# Patient Record
Sex: FEMALE | Race: WHITE | NOT HISPANIC OR LATINO | Employment: OTHER | URBAN - NONMETROPOLITAN AREA
[De-identification: names, ages, dates, MRNs, and addresses within clinical notes are randomized per-mention and may not be internally consistent; named-entity substitution may affect disease eponyms.]

---

## 2024-06-10 ENCOUNTER — OFFICE VISIT (OUTPATIENT)
Dept: NEUROLOGY | Facility: CLINIC | Age: 64
End: 2024-06-10
Payer: MEDICARE

## 2024-06-10 ENCOUNTER — TELEPHONE (OUTPATIENT)
Dept: NEUROLOGY | Facility: CLINIC | Age: 64
End: 2024-06-10
Payer: MEDICARE

## 2024-06-10 VITALS
BODY MASS INDEX: 29.03 KG/M2 | WEIGHT: 185 LBS | SYSTOLIC BLOOD PRESSURE: 142 MMHG | HEART RATE: 78 BPM | DIASTOLIC BLOOD PRESSURE: 76 MMHG | HEIGHT: 67 IN | OXYGEN SATURATION: 94 %

## 2024-06-10 DIAGNOSIS — M54.2 NECK PAIN: ICD-10-CM

## 2024-06-10 DIAGNOSIS — G44.329 CHRONIC POST-TRAUMATIC HEADACHE, NOT INTRACTABLE: Primary | ICD-10-CM

## 2024-06-10 DIAGNOSIS — G43.109 MIGRAINE WITH AURA AND WITHOUT STATUS MIGRAINOSUS, NOT INTRACTABLE: ICD-10-CM

## 2024-06-10 DIAGNOSIS — G47.19 DAYTIME HYPERSOMNOLENCE: ICD-10-CM

## 2024-06-10 DIAGNOSIS — G44.40 MEDICATION OVERUSE HEADACHE: ICD-10-CM

## 2024-06-10 PROCEDURE — 1159F MED LIST DOCD IN RCRD: CPT | Performed by: NURSE PRACTITIONER

## 2024-06-10 PROCEDURE — 1160F RVW MEDS BY RX/DR IN RCRD: CPT | Performed by: NURSE PRACTITIONER

## 2024-06-10 PROCEDURE — 99204 OFFICE O/P NEW MOD 45 MIN: CPT | Performed by: NURSE PRACTITIONER

## 2024-06-10 RX ORDER — ALMOTRIPTAN 12.5 MG/1
TABLET, FILM COATED ORAL ONCE AS NEEDED
COMMUNITY
End: 2024-06-10 | Stop reason: SDUPTHER

## 2024-06-10 RX ORDER — METHYLPREDNISOLONE 4 MG/1
TABLET ORAL
Qty: 21 TABLET | Refills: 0 | Status: SHIPPED | OUTPATIENT
Start: 2024-06-10

## 2024-06-10 RX ORDER — AMITRIPTYLINE HYDROCHLORIDE 50 MG/1
50 TABLET, FILM COATED ORAL NIGHTLY
COMMUNITY
End: 2024-06-10 | Stop reason: SDUPTHER

## 2024-06-10 RX ORDER — BLOOD-GLUCOSE SENSOR
EACH MISCELLANEOUS
COMMUNITY
Start: 2024-03-06

## 2024-06-10 RX ORDER — MONTELUKAST SODIUM 10 MG/1
10 TABLET ORAL NIGHTLY
COMMUNITY

## 2024-06-10 RX ORDER — INSULIN DETEMIR 100 [IU]/ML
INJECTION, SOLUTION SUBCUTANEOUS
COMMUNITY

## 2024-06-10 RX ORDER — DAPAGLIFLOZIN 10 MG/1
10 TABLET, FILM COATED ORAL DAILY
COMMUNITY
Start: 2024-04-02

## 2024-06-10 RX ORDER — CYANOCOBALAMIN 1000 UG/ML
1000 INJECTION, SOLUTION INTRAMUSCULAR; SUBCUTANEOUS
COMMUNITY
Start: 2024-05-10

## 2024-06-10 RX ORDER — ESTRADIOL 1 MG/1
1 TABLET ORAL DAILY
COMMUNITY
Start: 2024-05-28

## 2024-06-10 RX ORDER — INSULIN ASPART 100 [IU]/ML
INJECTION, SOLUTION INTRAVENOUS; SUBCUTANEOUS
COMMUNITY
Start: 2024-04-02

## 2024-06-10 RX ORDER — ALMOTRIPTAN 12.5 MG/1
12.5 TABLET, FILM COATED ORAL ONCE AS NEEDED
Qty: 10 TABLET | Refills: 3 | Status: SHIPPED | OUTPATIENT
Start: 2024-06-10

## 2024-06-10 RX ORDER — ALPRAZOLAM 0.25 MG/1
0.25 TABLET ORAL NIGHTLY PRN
COMMUNITY

## 2024-06-10 RX ORDER — ROSUVASTATIN CALCIUM 5 MG/1
5 TABLET, COATED ORAL DAILY
COMMUNITY
Start: 2024-04-02

## 2024-06-10 RX ORDER — AMITRIPTYLINE HYDROCHLORIDE 100 MG/1
100 TABLET ORAL NIGHTLY
Qty: 30 TABLET | Refills: 5 | Status: SHIPPED | OUTPATIENT
Start: 2024-06-10

## 2024-06-10 NOTE — ASSESSMENT & PLAN NOTE
She has had quite a bit of neck pain/tension for a while.  This did seem to get a little worse with regard to her fall.  We will get her into PT.

## 2024-06-10 NOTE — ASSESSMENT & PLAN NOTE
She is routinely taking OTC medications and I am concerned that this is causing some rebound headache. We will send a medrol pack and I have advised against using these mediations for 6 weeks if able.

## 2024-06-10 NOTE — TELEPHONE ENCOUNTER
Kirsten with [facility] calls asking clarification for the  prescription for Axert should read as take one at onset of migraine and may repeat in 2 hours if needed instead of take one at onset of migraine.  I did ask Morteza Burks NP and  he does state it should read as may take one at onset and repeat in 2 hours.  Pharmacy voices understanding and will make correction.
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
## 2024-06-10 NOTE — ASSESSMENT & PLAN NOTE
Her migraines seem to be doing well with the use of Axert and Elavil.  We will continue these and increase Elavil as above.
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
## 2024-06-10 NOTE — ASSESSMENT & PLAN NOTE
She has some symptoms that are concerning for sleep apnea which do include jaw clenching, awakenings at night, and snoring. We will ensure she has no RULA.  She was tested many years ago back in the 90's.

## 2024-07-16 ENCOUNTER — HOSPITAL ENCOUNTER (OUTPATIENT)
Dept: SLEEP CENTER | Age: 64
Discharge: HOME | End: 2024-07-18
Payer: MEDICARE

## 2024-07-16 PROCEDURE — G0399 HOME SLEEP TEST/TYPE 3 PORTA: HCPCS

## 2024-07-16 NOTE — PROGRESS NOTES
Ms. Yolanda Lindsey presented today in the sleep center for a Home Sleep Test (HST).  Ms. Lindsey was instructed on the device and was requested to wear the unit for two nights. Ms. Lindsey was asked to have the HST monitor back by 12PM on  07/18/2024. The patient acknowledged she understood. The HST device was tested and was in working order.

## 2024-07-17 PROCEDURE — G0399 HOME SLEEP TEST/TYPE 3 PORTA: HCPCS

## 2024-07-27 DIAGNOSIS — G47.33 OSA (OBSTRUCTIVE SLEEP APNEA): Primary | ICD-10-CM

## 2024-07-31 DIAGNOSIS — G47.19 DAYTIME HYPERSOMNOLENCE: ICD-10-CM

## 2024-08-30 ENCOUNTER — TELEPHONE (OUTPATIENT)
Dept: NEUROLOGY | Facility: CLINIC | Age: 64
End: 2024-08-30
Payer: MEDICARE

## 2024-08-30 NOTE — TELEPHONE ENCOUNTER
CALLED PATIENT TO LET THEM KNOW THAT WE HAVE CANCELED THEIR APPOINTMENT. JODY LAST OFFICIAL DAY IS TODAY AND HE WILL NO LONGER BE SEEING ANY PATIENTS IN NEUROLOGY GOING FORWARD. I LET THEM KNOW AT THIS TIME WE DO NOT HAVE A NEW PROVIDER COMING IN TO TAKE OVER HIS PATIENTS BUT WE ARE LOOKING FOR SOMEONE, EVERYONE IS ON A WAIT LIST AND ONCE THEY HAVE SOMEONE ABLE TO COME IN WE WILL GIVE ALL OF [facility] PATIENTS A CALL AND GET THEM SCHEDUELD. I LET THEM ALSO KNOW THAT WE ARE STILL ABLE TO FILL ANY MEDS NEEDED THAT THEY HAVE ALREADY BEEN ON BUT WE WILL NOT BE ABLE TO MAKE ANY CHANGES AND IF THEY NEED CHANGES OR ARE HAVING NEW PROBLEMS OR CONCERNS THEY WILL NEED TO DIRECT THOSE TO THEIR PRIMARY CARE PROVIDER AND THEY WILL NEED TO HANDLE THOSE CONCERNS OR REFER THEM TO NEW NEUROLOGIST.   PATIENT VOICED UNDERSTANDING.

## 2025-02-21 DIAGNOSIS — G44.329 CHRONIC POST-TRAUMATIC HEADACHE, NOT INTRACTABLE: ICD-10-CM

## 2025-02-21 RX ORDER — AMITRIPTYLINE HYDROCHLORIDE 100 MG/1
100 TABLET ORAL NIGHTLY
Qty: 30 TABLET | Refills: 0 | Status: SHIPPED | OUTPATIENT
Start: 2025-02-21

## 2025-02-21 NOTE — TELEPHONE ENCOUNTER
Caller: BaldwinNamrata    Relationship: Self    Best call back number:   Telephone Information:   Mobile [phone]         Requested Prescriptions:   Requested Prescriptions     Pending Prescriptions Disp Refills    amitriptyline (ELAVIL) 100 MG tablet 30 tablet 5     Sig: Take 1 tablet by mouth Every Night.        Pharmacy where request should be sent: [facility] [address] [place] [address] [phone] [facility] [phone]      Last office visit with prescribing clinician: Visit date not found   Last telemedicine visit with prescribing clinician: Visit date not found   Next office visit with prescribing clinician: Visit date not found     Additional details provided by patient: PT STATES SHE RAN OUT OF MEDICATION ON 2-17-25 AND HER PCP WILL NOT REFILL [facility] CHANGED THE DOSE. SHE HAS NOT BEEN CONTACTED TO ESTABLISH WITH A DIFFERENT PROVIDER SINCE HER PROVIDER LEFT. SO SHE'S NOT SURE WHO SHE CAN SCHEDULE WITH, PLEASE REVIEW AND ADVISE.     Does the patient have less than a 3 day supply:  [x] Yes  [] No    Would you like a call back once the refill request has been completed: [] Yes [x] No    If the office needs to give you a call back, can they leave a voicemail: [] Yes [x] No    Lorena Castro Rep   02/21/25 10:54 CST

## 2025-03-18 DIAGNOSIS — G44.329 CHRONIC POST-TRAUMATIC HEADACHE, NOT INTRACTABLE: ICD-10-CM

## 2025-03-18 RX ORDER — AMITRIPTYLINE HYDROCHLORIDE 100 MG/1
100 TABLET ORAL NIGHTLY
Qty: 30 TABLET | Refills: 0 | Status: SHIPPED | OUTPATIENT
Start: 2025-03-18

## 2025-03-31 ENCOUNTER — TELEPHONE (OUTPATIENT)
Dept: NEUROLOGY | Facility: CLINIC | Age: 65
End: 2025-03-31
Payer: MEDICARE

## 2025-04-02 ENCOUNTER — OFFICE VISIT (OUTPATIENT)
Dept: NEUROLOGY | Facility: CLINIC | Age: 65
End: 2025-04-02
Payer: MEDICARE

## 2025-04-02 VITALS
WEIGHT: 198 LBS | HEART RATE: 86 BPM | DIASTOLIC BLOOD PRESSURE: 88 MMHG | HEIGHT: 67 IN | OXYGEN SATURATION: 96 % | SYSTOLIC BLOOD PRESSURE: 126 MMHG | BODY MASS INDEX: 31.08 KG/M2

## 2025-04-02 DIAGNOSIS — R51.9 HEADACHE UPON AWAKENING: ICD-10-CM

## 2025-04-02 DIAGNOSIS — G43.109 MIGRAINE WITH AURA AND WITHOUT STATUS MIGRAINOSUS, NOT INTRACTABLE: ICD-10-CM

## 2025-04-02 DIAGNOSIS — G44.329 CHRONIC POST-TRAUMATIC HEADACHE, NOT INTRACTABLE: Primary | ICD-10-CM

## 2025-04-02 RX ORDER — INSULIN GLARGINE 100 [IU]/ML
40 INJECTION, SOLUTION SUBCUTANEOUS NIGHTLY
COMMUNITY

## 2025-04-02 RX ORDER — AMITRIPTYLINE HYDROCHLORIDE 100 MG/1
100 TABLET ORAL NIGHTLY
Qty: 90 TABLET | Refills: 3 | Status: SHIPPED | OUTPATIENT
Start: 2025-04-02 | End: 2026-04-02

## 2025-04-02 NOTE — PROGRESS NOTES
Neurology Consult Note    [facility]  [address], [address]  [place] [address]  Phone: [phone]  Fax: [phone]    Referring Provider:   No ref. provider found  Primary Care Provider:  Erick Bhatt MD    Reason for Consult:  Headache syndromes   Subjective      Namrata Baldwin presents to [facility]    History of Present Illness  64-year-old female seen for follow-up of chronic posttraumatic headache, migraine and medication overuse headache.  She was last seen in clinic on 6/10/2024 by AUSTIN Mary.  Review that record shows patient has a history of migraines since puberty.  They stopped after her first child however returned after going through menopause.  She also noted worsening headaches after a fall.  She reported last encounter having a migraine approximately once a week.  She also notes daily headaches that occur upon awakening.  She reported taking ibuprofen approximately 4 times weekly    She did have a home sleep study performed in July 2024 through [facility].  This did reveal moderate obstructive sleep apnea.  It was recommended to consider an AutoPap    Today patient presents by herself.  Reports to me continued daily headaches that occur in the morning upon awakening.  Typically these will go away within a couple of hours.  She does question the sleep study she had performed last year.  Reportedly the pulse oximetry fell off during the test.  She declined wanting to have a CPAP.  She notes she does clench her jaw during the night.  She is currently taking Xanax 0.5 mg at night to help with this.  She is a respiratory therapist.    In regards to her migraines, notes she has had a slight increase due to the recent weather changes.  However the almotriptan does continue to work as an abortive agent.  Typically she only take 1 tablet and will be pain-free within 3 hours.  She does lie down when she takes this.  Patient Active Problem  List   Diagnosis    Chronic post-traumatic headache, not intractable    Daytime hypersomnolence    Migraine with aura and without status migrainosus, not intractable    Neck pain    Medication overuse headache        Past Medical History:   Diagnosis Date    Cancer 6/2000    Basal Cell    CTS (carpal tunnel syndrome) 2020    Diabetes mellitus 2008    Head injury Feb 26,2024    Headache, tension-type 1976    HL (hearing loss) 1994    Hyperlipidemia 2015    Migraine 2004        Social History     Socioeconomic History    Marital status:    Tobacco Use    Smoking status: Never    Smokeless tobacco: Never   Vaping Use    Vaping status: Never Used   Substance and Sexual Activity    Alcohol use: Never    Drug use: Never    Sexual activity: Yes     Partners: Male     Birth control/protection: Post-menopausal        Allergies   Allergen Reactions    Hexachlorophene Hives and Rash    Hydrocodone Other (See Comments) and Shortness Of Breath     STRIDOR    Hydroxyzine Hcl Swelling     Other reaction(s): FLUSH    -    Meperidine Other (See Comments) and Shortness Of Breath     - STRIDOR    Morphine Other (See Comments) and Shortness Of Breath     STRIDOR    Nalbuphine Hcl Other (See Comments) and Shortness Of Breath     - STRIDOR    Oxycodone Hcl Other (See Comments) and Shortness Of Breath     - STRIDOR    Propoxyphene Other (See Comments) and Shortness Of Breath     - STRIDOR    Tramadol Other (See Comments) and Shortness Of Breath     - STRIDOR    Rosuvastatin Other (See Comments)     SEVERE LEG CRAMPING    Chloral Hydrate Rash    Colistimethate Rash    Gramicidin Rash    Neomycin Rash    Polymyxin B Rash    Povidone-Iodine Rash    Pramoxine Hcl Rash    Soap Rash          Current Outpatient Medications:     almotriptan (AXERT) 12.5 MG tablet, Take 1 tablet by mouth 1 (One) Time As Needed for Migraine., Disp: 10 tablet, Rfl: 3    ALPRAZolam (XANAX) 0.25 MG tablet, Take 1 tablet by mouth At Night As Needed., Disp: ,  "Rfl:     amitriptyline (ELAVIL) 100 MG tablet, Take 1 tablet by mouth Every Night., Disp: 90 tablet, Rfl: 3    Continuous Glucose Sensor (FreeStyle Patricia 3 Sensor) misc, USE AS DIRECTED 1 EACH CONTINUEOUSLY, Disp: , Rfl:     cyanocobalamin 1000 MCG/ML injection, Inject 1 mL under the skin into the appropriate area as directed Every 30 (Thirty) Days., Disp: , Rfl:     dapagliflozin Propanediol 10 MG tablet, Take 10 mg by mouth Daily., Disp: , Rfl:     estradiol (ESTRACE) 1 MG tablet, Take 1 tablet by mouth Daily., Disp: , Rfl:     montelukast (SINGULAIR) 10 MG tablet, Take 1 tablet by mouth Every Night., Disp: , Rfl:     NovoLOG FlexPen 100 UNIT/ML solution pen-injector sc pen, Inject  under the skin into the appropriate area as directed., Disp: , Rfl:     rosuvastatin (CRESTOR) 5 MG tablet, Take 1 tablet by mouth Daily., Disp: , Rfl:     insulin glargine (LANTUS, SEMGLEE) 100 UNIT/ML injection, Inject 40 Units under the skin into the appropriate area as directed Every Night., Disp: , Rfl:        Objective   Vital Signs:   /88   Pulse 86   Ht 170.2 cm (67\")   Wt 89.8 kg (198 lb)   SpO2 96%   BMI 31.01 kg/m²       Physical Exam  Vitals and nursing note reviewed.   Constitutional:       Appearance: Normal appearance.   HENT:      Head: Normocephalic.   Eyes:      General: Lids are normal.      Extraocular Movements: Extraocular movements intact.      Pupils: Pupils are equal, round, and reactive to light.   Pulmonary:      Effort: Pulmonary effort is normal. No respiratory distress.   Skin:     General: Skin is warm and dry.   Neurological:      Mental Status: She is alert.      Motor: Motor strength is normal.  Psychiatric:         Speech: Speech normal.        Neurological Exam  Mental Status  Alert. Oriented to person, place, time and situation. Speech is normal. Language is fluent with no aphasia.    Cranial Nerves  CN II: Visual fields full to confrontation.  CN III, IV, VI: Extraocular movements intact " bilaterally. Normal lids and orbits bilaterally. Pupils equal round and reactive to light bilaterally.  CN V: Facial sensation is normal.  CN VII: Full and symmetric facial movement.  CN IX, X: Palate elevates symmetrically. Normal gag reflex.  CN XI: Shoulder shrug strength is normal.  CN XII: Tongue midline without atrophy or fasciculations.    Motor   Strength is 5/5 throughout all four extremities.    Gait  Casual gait is normal including stance, stride, and arm swing.      Result Review :   The following data was reviewed by: AUSTIN Sterling on 2025:       Office Visit with Morteza Burks APRN (06/10/2024)     Progress Notes  - documented in this encounter   Table of Contents for Progress Notes   Brianna Jackson MD - 2024 3:00 PM CDT   Nahun Angulo - 2024 3:00 PM CDT   Brianna Jackson MD - 2024 3:00 PM CDT      Brianna Jackson MD - 2024 3:00 PM CDT  Formatting of this note is different from the original.  Images from the original note were not included.  [facility]  [address] [place] [address]  Phone [phone] Fax [phone]    Patient Name Namrata Baldwin Account Number [id number]   1960 Referring Provider AUSTIN Mary  Age/ Gender 64 years/F Interpreting physician Brianna Jackson M.D., [facility], [facility]  Neck circumference Unavailable Device Liz Dobbins  Mallampati classification Unavailable Scoring Technician Nuzhat Andre, ELVER, RPSGT  [place] score 8/24 Indications for the test excessive daytime somnolence  Height 67.0 inches Test Home Sleep Apnea Test  Weight 185.0 lbs Date of test 2024  BMI 29.0 Time in Bed (TIB) 241.0 minutes    Events  Index (#/hr) Total # Events Mean Duration (sec) Max Duration (sec) Events by Position  Supine Non-Supine  # Index # Index  Central Apneas 0.0 0 0.0 0.0 0 0.0 0 0.0  Obstructive Apneas 12.2 49 24.2 69.5 0 0.0 1 75.0  Mixed Apneas 0.0 0  0.0 0.0 0 0.0 0 0.0  Hypopneas 9.2 37 39.6 117.5 0 0.0 0 0.0  Estimated AHI  #events/TIB (hrs) 21.4 86 30.8 117.5 0.0 75.0  Time in Position (minutes) 2.1 0.8    Snoring  TST with snoring 153.6  % of TST with snoring 63.7    Oximetry distribution  <95 % (minutes) 101.9  <90 % (minutes) 1.7  <85 % (minutes) 0.0  <80 % (minutes) 0.0  <75 % (minutes) 0.0  <70 % (minutes) 0.0  <60 % (minutes) 0.0  <50 % (minutes) 0.0  Average (%) 95  Desat Index (#/hour) 19.6  Desat Max (%) 10  Desat Max dur (sec) 123.0  Lowest SpO2 (³ 2 sec) (%) 87    Heart Rate  Mean HR (BPM) 77.5  # of LHR 3  LHR min (BPM) 69  # of HHR 1  HHR max (BPM) 105    Interpretation:    Moderate obstructive sleep apnea.  Hypoxia during sleep.    Recommendations:    Consider Auto CPAP to titrate between 8cm water pressure and 20cm water pressure.  Weight loss and exercise.  Avoid risky activity such as driving if sleepy.  Discuss sleep hygiene with the patient.  Monitor PAP use and effectiveness.      Brianna Jackson MD, [facility], [facility]  Electronically signed by Ni Mcnally at 07/22/2024 2:32 PM EDT  Electronically signed by Brianna Jackson MD at 07/27/2024 10:04 PM EDT               Impression:  Namrata Baldwin is a 64 y.o. female who presents for follow-up of headache syndromes.  Patient's migraines under control with the use of amitriptyline 100 mg nightly.  The slight increase in the last month is likely due to the significant weather changes we have been experiencing.  She notes this can be a trigger for her.  Regards to her daily headaches, likely the result of her jaw clenching as well as obstructive sleep apnea as evident by home sleep study.  However patient has declined wanting to use a CPAP.  Continue recommending current regimen.    Diagnoses and all orders for this visit:    1. Chronic post-traumatic headache, not intractable (Primary)  -     amitriptyline (ELAVIL) 100 MG tablet; Take 1 tablet by mouth Every Night.  Dispense: 90  tablet; Refill: 3    2. Migraine with aura and without status migrainosus, not intractable    3. Headache upon awakening        Plan:  Continue amitriptyline 100 mg nightly for prevention  Continue almotriptan 12.5 mg tablet as needed for   Defer further discussion of management of home sleep study and results of sleep apnea to sleep medicine  Follow-up with PCP as scheduled  Follow-up in my clinic 6 months or sooner if needed    The patient and I have discussed the plan of care and she is in full agreement at this time.     Follow Up   Return in about 6 months (around 10/2/2025) for Migraine.            Ramirez Phillip, AUSTIN  25  09:18 CDT

## 2025-05-08 ENCOUNTER — OFFICE VISIT (OUTPATIENT)
Dept: PULMONOLOGY | Age: 65
End: 2025-05-08
Payer: MEDICARE

## 2025-05-08 VITALS
RESPIRATION RATE: 20 BRPM | HEART RATE: 81 BPM | DIASTOLIC BLOOD PRESSURE: 77 MMHG | TEMPERATURE: 97.7 F | SYSTOLIC BLOOD PRESSURE: 126 MMHG | BODY MASS INDEX: 32.43 KG/M2 | HEIGHT: 67 IN | WEIGHT: 206.6 LBS | OXYGEN SATURATION: 96 %

## 2025-05-08 DIAGNOSIS — E66.9 OBESITY (BMI 30-39.9): ICD-10-CM

## 2025-05-08 DIAGNOSIS — G47.8 NON-RESTORATIVE SLEEP: ICD-10-CM

## 2025-05-08 DIAGNOSIS — Z78.9 NEVER SMOKED ANY SUBSTANCE: ICD-10-CM

## 2025-05-08 DIAGNOSIS — R06.83 SNORING: ICD-10-CM

## 2025-05-08 DIAGNOSIS — G47.33 MODERATE OBSTRUCTIVE SLEEP APNEA: Primary | ICD-10-CM

## 2025-05-08 PROCEDURE — 1090F PRES/ABSN URINE INCON ASSESS: CPT | Performed by: INTERNAL MEDICINE

## 2025-05-08 PROCEDURE — 1123F ACP DISCUSS/DSCN MKR DOCD: CPT | Performed by: INTERNAL MEDICINE

## 2025-05-08 PROCEDURE — 1036F TOBACCO NON-USER: CPT | Performed by: INTERNAL MEDICINE

## 2025-05-08 PROCEDURE — G8417 CALC BMI ABV UP PARAM F/U: HCPCS | Performed by: INTERNAL MEDICINE

## 2025-05-08 PROCEDURE — G8399 PT W/DXA RESULTS DOCUMENT: HCPCS | Performed by: INTERNAL MEDICINE

## 2025-05-08 PROCEDURE — 1159F MED LIST DOCD IN RCRD: CPT | Performed by: INTERNAL MEDICINE

## 2025-05-08 PROCEDURE — G8427 DOCREV CUR MEDS BY ELIG CLIN: HCPCS | Performed by: INTERNAL MEDICINE

## 2025-05-08 PROCEDURE — 99204 OFFICE O/P NEW MOD 45 MIN: CPT | Performed by: INTERNAL MEDICINE

## 2025-05-08 PROCEDURE — 3017F COLORECTAL CA SCREEN DOC REV: CPT | Performed by: INTERNAL MEDICINE

## 2025-05-08 RX ORDER — MONTELUKAST SODIUM 10 MG/1
10 TABLET ORAL NIGHTLY
COMMUNITY

## 2025-05-08 RX ORDER — FLURBIPROFEN SODIUM 0.3 MG/ML
SOLUTION/ DROPS OPHTHALMIC
COMMUNITY
Start: 2025-04-09

## 2025-05-08 RX ORDER — DAPAGLIFLOZIN 10 MG/1
TABLET, FILM COATED ORAL
COMMUNITY

## 2025-05-08 RX ORDER — ROSUVASTATIN CALCIUM 5 MG/1
5 TABLET, COATED ORAL DAILY
COMMUNITY
Start: 2025-04-07

## 2025-05-08 RX ORDER — ANTIOX #8/OM3/DHA/EPA/LUT/ZEAX 250-2.5 MG
CAPSULE ORAL
COMMUNITY
Start: 2023-03-03

## 2025-05-08 RX ORDER — ESTRADIOL 1 MG/1
1 TABLET ORAL DAILY
COMMUNITY
Start: 2024-05-28

## 2025-05-08 RX ORDER — PREGABALIN 25 MG/1
25 CAPSULE ORAL 2 TIMES DAILY
COMMUNITY

## 2025-05-08 RX ORDER — INSULIN GLARGINE 100 [IU]/ML
INJECTION, SOLUTION SUBCUTANEOUS
COMMUNITY

## 2025-05-08 RX ORDER — ALMOTRIPTAN 12.5 MG/1
12.5 TABLET, FILM COATED ORAL
COMMUNITY
Start: 2024-06-10

## 2025-05-08 RX ORDER — MULTIVIT WITH MINERALS/LUTEIN
TABLET ORAL
COMMUNITY
Start: 2017-10-09

## 2025-05-08 RX ORDER — ALPRAZOLAM 0.25 MG
TABLET ORAL
COMMUNITY

## 2025-05-08 RX ORDER — AMITRIPTYLINE HYDROCHLORIDE 100 MG/1
100 TABLET ORAL NIGHTLY
COMMUNITY

## 2025-05-08 RX ORDER — SEMAGLUTIDE 0.68 MG/ML
0.25 INJECTION, SOLUTION SUBCUTANEOUS
COMMUNITY
Start: 2025-04-22

## 2025-05-08 RX ORDER — INSULIN GLARGINE 100 [IU]/ML
40 INJECTION, SOLUTION SUBCUTANEOUS NIGHTLY
COMMUNITY

## 2025-05-08 RX ORDER — INSULIN ASPART 100 [IU]/ML
INJECTION, SOLUTION INTRAVENOUS; SUBCUTANEOUS
COMMUNITY
Start: 2025-04-21

## 2025-05-08 ASSESSMENT — ENCOUNTER SYMPTOMS
ANAL BLEEDING: 0
BACK PAIN: 0
APNEA: 1
COUGH: 0
RHINORRHEA: 0
SHORTNESS OF BREATH: 0
WHEEZING: 0
ABDOMINAL DISTENTION: 0
ABDOMINAL PAIN: 0

## 2025-05-08 NOTE — PROGRESS NOTES
Pulmonary and Sleep Medicine    Yolanda Lindsey (:  1960) is a 65 y.o. female,New patient, here for evaluation of the following chief complaint(s):  New Patient (NP- SHANELL/Sleep Study completed on 2024/Pt is not on CPAP. She states that she would like to know about her sleep study. She states that the pulse oximeter fell off of her finger. )      Referring physician:  Christopher Ortiz, Aprn - Cnp  [address]  [place] [address]     ASSESSMENT/PLAN:  1. Moderate obstructive sleep apnea.  Apnea-hypopnea index of 21 events per hour on home sleep study done on 2024  -     [facility] [place] as [place]  2. Obesity (BMI 30-39.9)  3. Non-restorative sleep  4. Snoring  5. Never smoked any substance        Will start her on auto CPAP.  Will reevaluate again in about 6 weeks.          Ranjith Hogan MD, [facility] [place] [facility]    No follow-ups on file.    SUBJECTIVE/OBJECTIVE:        The patient is here for evaluation of obstructive sleep apnea.  She underwent a home sleep study in 2024.  Her sleep study showed moderate obstructive sleep apnea with an apnea-hypopnea index of about 21 events per hour.  She elected not to start CPAP at that time.  Since then the patient discontinued sitting starting CPAP due to sleep-related hypoxia.  She endorses daytime symptoms of fatigue and hypersomnia and sleepiness.  She does take naps during the day.  She never smoked.          Continue the following medications as reported by the patient:    Prior to Visit Medications    Medication Sig Taking? Authorizing Provider   almotriptan (AXERT) 12.5 MG tablet Take 1 tablet by mouth once as needed Yes Gabbi Corona MD   rosuvastatin (CRESTOR) 5 MG tablet Take 1 tablet by mouth daily Yes ProviderGabbi MD   ALPRAZolam (XANAX) 0.25 MG tablet TAKE 1 TABLET BY MOUTH THREE TIMES DAILY AS NEEDED FOR SLEEP Yes ProviderGabbi MD   amitriptyline (ELAVIL) 100 MG tablet Take 1 tablet by mouth at

## 2025-05-09 DIAGNOSIS — G47.33 MODERATE OBSTRUCTIVE SLEEP APNEA: Primary | ICD-10-CM

## 2025-07-14 ENCOUNTER — OFFICE VISIT (OUTPATIENT)
Dept: PULMONOLOGY | Age: 65
End: 2025-07-14
Payer: MEDICARE

## 2025-07-14 VITALS
SYSTOLIC BLOOD PRESSURE: 146 MMHG | HEART RATE: 92 BPM | BODY MASS INDEX: 32.39 KG/M2 | WEIGHT: 206.8 LBS | TEMPERATURE: 97.8 F | RESPIRATION RATE: 95 BRPM | DIASTOLIC BLOOD PRESSURE: 85 MMHG

## 2025-07-14 DIAGNOSIS — Z78.9 NEVER SMOKED ANY SUBSTANCE: ICD-10-CM

## 2025-07-14 DIAGNOSIS — G47.33 MODERATE OBSTRUCTIVE SLEEP APNEA: Primary | ICD-10-CM

## 2025-07-14 DIAGNOSIS — R06.83 SNORING: ICD-10-CM

## 2025-07-14 DIAGNOSIS — E66.9 OBESITY (BMI 30-39.9): ICD-10-CM

## 2025-07-14 DIAGNOSIS — G47.8 NON-RESTORATIVE SLEEP: ICD-10-CM

## 2025-07-14 PROCEDURE — 1159F MED LIST DOCD IN RCRD: CPT | Performed by: NURSE PRACTITIONER

## 2025-07-14 PROCEDURE — 3017F COLORECTAL CA SCREEN DOC REV: CPT | Performed by: NURSE PRACTITIONER

## 2025-07-14 PROCEDURE — 1036F TOBACCO NON-USER: CPT | Performed by: NURSE PRACTITIONER

## 2025-07-14 PROCEDURE — 1090F PRES/ABSN URINE INCON ASSESS: CPT | Performed by: NURSE PRACTITIONER

## 2025-07-14 PROCEDURE — 1123F ACP DISCUSS/DSCN MKR DOCD: CPT | Performed by: NURSE PRACTITIONER

## 2025-07-14 PROCEDURE — G8399 PT W/DXA RESULTS DOCUMENT: HCPCS | Performed by: NURSE PRACTITIONER

## 2025-07-14 PROCEDURE — G8427 DOCREV CUR MEDS BY ELIG CLIN: HCPCS | Performed by: NURSE PRACTITIONER

## 2025-07-14 PROCEDURE — 99214 OFFICE O/P EST MOD 30 MIN: CPT | Performed by: NURSE PRACTITIONER

## 2025-07-14 PROCEDURE — G8417 CALC BMI ABV UP PARAM F/U: HCPCS | Performed by: NURSE PRACTITIONER

## 2025-07-14 ASSESSMENT — ENCOUNTER SYMPTOMS
ALLERGIC/IMMUNOLOGIC NEGATIVE: 1
GASTROINTESTINAL NEGATIVE: 1
EYES NEGATIVE: 1
APNEA: 1

## 2025-07-14 NOTE — PROGRESS NOTES
-300, 8 UNITS -350, AND 10 UNITS FOR >350. MAX 30 UNITS PER DAY. Yes Gabbi Corona MD   LANTUS SOLOSTAR 100 UNIT/ML injection pen INJECT 40 UNITS BY SUBCUTANEOUS ROUTE NIGHTLY Yes Gabbi Corona MD   insulin glargine (LANTUS) 100 UNIT/ML injection vial Inject 40 Units into the skin nightly Yes Gabbi Corona MD   OZEMPIC, 0.25 OR 0.5 MG/DOSE, 2 MG/3ML SOPN Inject 0.25 mg into the skin every 7 days Yes Gabbi Corona MD B-D UF III MINI PEN NEEDLES 31G X 5 MM MISC USE 1 EACH 4 TIMES DAILY AS DIRECTED Yes Gabbi Corona MD   pregabalin (LYRICA) 25 MG capsule Take 1 capsule by mouth 2 times daily. Yes Gabbi Corona MD   Calcium Carb-Cholecalciferol 600-12.5 MG-MCG CAPS  Yes Gabbi Corona MD   Multiple Vitamins-Minerals (PRESERVISION AREDS 2) CAPS  Yes Gabbi Corona MD   vitamin E 400 UNIT capsule  Yes Gabbi Corona MD   Cetirizine HCl (ZYRTEC ALLERGY PO)  Yes Gabbi Corona MD   montelukast (SINGULAIR) 10 MG tablet Take 1 tablet by mouth nightly Yes Gabbi Corona MD   rosuvastatin (CRESTOR) 5 MG tablet Take 1 tablet by mouth daily  Patient not taking: Reported on 7/14/2025  Gabbi Corona MD        Review of Systems   Constitutional: Negative.    HENT: Negative.     Eyes: Negative.    Respiratory:  Positive for apnea.    Cardiovascular: Negative.    Gastrointestinal: Negative.    Musculoskeletal:  Positive for arthralgias.   Skin: Negative.    Allergic/Immunologic: Negative.    Neurological: Negative.    Psychiatric/Behavioral: Negative.         Vitals:    07/14/25 1512   BP: (!) 146/85   Pulse: 92   Resp: (!) 95   Temp: 97.8 °F (36.6 °C)   TempSrc: Temporal   Weight: 93.8 kg (206 lb 12.8 oz)      BMI Readings from Last 1 Encounters:   07/14/25 32.39 kg/m²         Physical Exam  Constitutional:       Appearance: Normal appearance.   HENT:      Head: Normocephalic.      Nose: Nose normal.      Mouth/Throat:

## 2025-08-27 ENCOUNTER — OFFICE VISIT (OUTPATIENT)
Dept: PULMONOLOGY | Age: 65
End: 2025-08-27
Payer: MEDICARE

## 2025-08-27 VITALS
BODY MASS INDEX: 30.92 KG/M2 | HEART RATE: 96 BPM | DIASTOLIC BLOOD PRESSURE: 82 MMHG | HEIGHT: 67 IN | TEMPERATURE: 97.9 F | OXYGEN SATURATION: 94 % | WEIGHT: 197 LBS | SYSTOLIC BLOOD PRESSURE: 137 MMHG

## 2025-08-27 DIAGNOSIS — G47.8 NON-RESTORATIVE SLEEP: ICD-10-CM

## 2025-08-27 DIAGNOSIS — E66.9 OBESITY (BMI 30-39.9): ICD-10-CM

## 2025-08-27 DIAGNOSIS — Z78.9 NEVER SMOKED ANY SUBSTANCE: ICD-10-CM

## 2025-08-27 DIAGNOSIS — G47.33 MODERATE OBSTRUCTIVE SLEEP APNEA: Primary | ICD-10-CM

## 2025-08-27 DIAGNOSIS — R06.83 SNORING: ICD-10-CM

## 2025-08-27 PROCEDURE — 1090F PRES/ABSN URINE INCON ASSESS: CPT | Performed by: NURSE PRACTITIONER

## 2025-08-27 PROCEDURE — G8399 PT W/DXA RESULTS DOCUMENT: HCPCS | Performed by: NURSE PRACTITIONER

## 2025-08-27 PROCEDURE — 1036F TOBACCO NON-USER: CPT | Performed by: NURSE PRACTITIONER

## 2025-08-27 PROCEDURE — 3017F COLORECTAL CA SCREEN DOC REV: CPT | Performed by: NURSE PRACTITIONER

## 2025-08-27 PROCEDURE — 1159F MED LIST DOCD IN RCRD: CPT | Performed by: NURSE PRACTITIONER

## 2025-08-27 PROCEDURE — G8427 DOCREV CUR MEDS BY ELIG CLIN: HCPCS | Performed by: NURSE PRACTITIONER

## 2025-08-27 PROCEDURE — 99213 OFFICE O/P EST LOW 20 MIN: CPT | Performed by: NURSE PRACTITIONER

## 2025-08-27 PROCEDURE — 1123F ACP DISCUSS/DSCN MKR DOCD: CPT | Performed by: NURSE PRACTITIONER

## 2025-08-27 PROCEDURE — G8417 CALC BMI ABV UP PARAM F/U: HCPCS | Performed by: NURSE PRACTITIONER

## 2025-08-27 ASSESSMENT — ENCOUNTER SYMPTOMS
APNEA: 1
EYES NEGATIVE: 1
ALLERGIC/IMMUNOLOGIC NEGATIVE: 1
GASTROINTESTINAL NEGATIVE: 1

## 2025-08-28 DIAGNOSIS — G47.33 MODERATE OBSTRUCTIVE SLEEP APNEA: Primary | ICD-10-CM
